# Patient Record
Sex: MALE | Race: WHITE | URBAN - METROPOLITAN AREA
[De-identification: names, ages, dates, MRNs, and addresses within clinical notes are randomized per-mention and may not be internally consistent; named-entity substitution may affect disease eponyms.]

---

## 2023-10-30 ENCOUNTER — TELEPHONE (OUTPATIENT)
Age: 78
End: 2023-10-30

## 2023-10-30 NOTE — TELEPHONE ENCOUNTER
Rec'd call from patients wife requesting NEW for FULL BODY. Explained wait/cancellation list processes and MyChart notification. Understanding was verbalized. Wife declined MyChart Activation link at this time. Created wait/cancellation list for patient.

## 2024-02-19 ENCOUNTER — TELEPHONE (OUTPATIENT)
Dept: DERMATOLOGY | Facility: CLINIC | Age: 79
End: 2024-02-19

## 2024-02-19 NOTE — TELEPHONE ENCOUNTER
Called number on file. No answer. Detailed msg left informing the upcoming appt on 2/29 will be canceled. They should call to reschedule and reassured it will likely have to be in the [place] office      Unable to send my chart message since it is not set up      *please schedule appt in next available with any provider

## 2024-02-20 NOTE — TELEPHONE ENCOUNTER
Rec'd call from patient's wife stating that she received message that she and her husbands appointments needed to be rescheduled .... Again.    Apologized to patient. Explained extended medical leave for Dr. Black.    Gave patient the option of cancellation list to wait for Dr. Clark schedule to re-open (no idea when that will be) OR to be scheduled at a different location with another provider. Patient resides in [place] and would prefer not to travel. She asked what else she could do? I told her that I would place her and her  on Dr. Black's cancellation/wait list as HIGH priority and/or patient could contact PCP to see if there were any other local dermatologists for them to check with.    Patient verbalized understanding.    Created cancellation/wait list for patient as HIGH priority.